# Patient Record
Sex: MALE | Race: WHITE | NOT HISPANIC OR LATINO | Employment: FULL TIME | URBAN - METROPOLITAN AREA
[De-identification: names, ages, dates, MRNs, and addresses within clinical notes are randomized per-mention and may not be internally consistent; named-entity substitution may affect disease eponyms.]

---
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
## 2024-01-24 ENCOUNTER — OFFICE VISIT (OUTPATIENT)
Dept: URGENT CARE | Facility: CLINIC | Age: 19
End: 2024-01-24
Payer: COMMERCIAL

## 2024-01-24 VITALS
HEIGHT: 68 IN | RESPIRATION RATE: 16 BRPM | WEIGHT: 130 LBS | HEART RATE: 93 BPM | DIASTOLIC BLOOD PRESSURE: 67 MMHG | SYSTOLIC BLOOD PRESSURE: 117 MMHG | OXYGEN SATURATION: 100 % | TEMPERATURE: 100 F | BODY MASS INDEX: 19.7 KG/M2

## 2024-01-24 DIAGNOSIS — J11.1 INFLUENZA-LIKE ILLNESS: ICD-10-CM

## 2024-01-24 DIAGNOSIS — R50.9 FEVER, UNSPECIFIED FEVER CAUSE: Primary | ICD-10-CM

## 2024-01-24 LAB
CTP QC/QA: YES
POC MOLECULAR INFLUENZA A AGN: NEGATIVE
POC MOLECULAR INFLUENZA B AGN: NEGATIVE

## 2024-01-24 PROCEDURE — 99203 OFFICE O/P NEW LOW 30 MIN: CPT | Mod: ,,, | Performed by: PHYSICIAN ASSISTANT

## 2024-01-24 PROCEDURE — 87502 INFLUENZA DNA AMP PROBE: CPT | Mod: QW,,, | Performed by: PHYSICIAN ASSISTANT

## 2024-01-24 NOTE — PATIENT INSTRUCTIONS
Fever / Body Aches: Use OTC Tylenol or Motrin per package instructions for fever or body aches.  Sore Throat: Use OTC lozenges or throat sprays, gargle with warm salt and water, warm tea with honey.   Cough:   Cover your mouth with coughing, avoid sharing cups or lip balm, wash your hand before eating or touching your face.    Please quarantine for 5 days from the onset of symptoms. On day 6 of illness, you can return to work/school as long as you have not experienced fever in the last 24 hours.   Seek emergency care for breathing difficulties, chest pain, shortness of breath, or confusion. Given the nature of this disease, severe respiratory distress can develop suddenly on day 8 or 9 of clinical course.  Follow up with you primary care doctor as needed.      Updated CDC guidelines can be found at: https://www.cdc.gov/coronavirus/2019-ncov/hcp/ghgvtx-ab-oaeb.html

## 2024-01-24 NOTE — PROGRESS NOTES
"Subjective:      Patient ID: Regan Ibrahim is a 18 y.o. male.    Vitals:  height is 5' 8" (1.727 m) and weight is 59 kg (130 lb). His temperature is 99.7 °F (37.6 °C). His blood pressure is 117/67 and his pulse is 93. His respiration is 16 and oxygen saturation is 100%.     Chief Complaint: Fever     Patient is a 18 y.o. male who presents to urgent care with complaints of fever 101, runny nose, cough, and headache x 1 day.  Denies sore throat shortness of breath or GI symptoms.      ROS   Objective:     Physical Exam   Constitutional: He is oriented to person, place, and time. He appears well-developed. He is cooperative.  Non-toxic appearance. He does not appear ill. No distress.   HENT:   Head: Normocephalic and atraumatic.   Ears:   Right Ear: Hearing, tympanic membrane, external ear and ear canal normal.   Left Ear: Hearing, tympanic membrane, external ear and ear canal normal.   Nose: Nose normal. No nasal deformity. No epistaxis.   Mouth/Throat: Uvula is midline, oropharynx is clear and moist and mucous membranes are normal. No trismus in the jaw. Normal dentition. No uvula swelling. No oropharyngeal exudate, posterior oropharyngeal edema or posterior oropharyngeal erythema.   Eyes: Conjunctivae and lids are normal. No scleral icterus.   Neck: Trachea normal and phonation normal. Neck supple. No edema present. No erythema present. No neck rigidity present.   Cardiovascular: Normal rate, regular rhythm, normal heart sounds and normal pulses.   Pulmonary/Chest: Effort normal and breath sounds normal. No respiratory distress. He has no decreased breath sounds. He has no rhonchi.   Abdominal: Normal appearance.   Musculoskeletal: Normal range of motion.         General: No deformity. Normal range of motion.   Neurological: He is alert and oriented to person, place, and time. He exhibits normal muscle tone. Coordination normal.   Skin: Skin is warm, dry, intact, not diaphoretic and not pale.   Psychiatric: His " First visit with breastfeeding mom. Discussed feeding baby on cue. Opening mouth, turning head from side to side, bringing hands to mouth and sucking movements are all early hunger cues. Crying is a late hunger cue. Do lots of skin to skin to help recognize early feeding cues. If baby does not nurse, continue skin to skin and offer again later. On average newborns eat at least 8 or more times per day without restriction. Cluster feeding is normal.  Reviewed positioning techniques and signs of a good latch. Discussed holding baby so that ear, shoulder and hip are in a straight line. Baby is held close and nose lines up with mom's nipple. Discussed supporting baby's neck and mom's breast.  When baby opens wide bring baby quickly onto the breast.  Try for an assymetrical latch with nipple pointed towards the roof of baby's mouth. Mouth should be wide and lips flanged around the breast.  Encouraged to nurse on the first breast until baby finishes that side. If baby comes off the breast quickly, you can re-offer that same side to ensure good stimulation before moving baby to next side. Offer the second breast, baby can take the second breast as long as desired. It is ok if they do not take the second side when offered. Listen and look for swallows. Once milk is in over the next few days, swallowing will become more frequent and obvious. If baby pausing on the breast longer than 10 seconds, remind baby to nurse. Attempt to burp between breasts and after feeding. Rotate which breast the baby starts on. Discussed expected output based on age. Discussed feed on demand. If necessary rouse for feedings at least until above birth weight. Reviewed Breastfeeding Packet and encouraged mom to write down feedings and output at least until first Ped visit.   In accordance with the 4391 AAP Policy Statement on Breastfeeding, Mothers of healthy term  infants should be delay pacifier use until breastfeeding "speech is normal and behavior is normal. Judgment and thought content normal.   Nursing note and vitals reviewed.    The patient declines COVID testing.     Previous History      Review of patient's allergies indicates:  No Known Allergies    Past Medical History:   Diagnosis Date    Known health problems: none      No current outpatient medications  Past Surgical History:   Procedure Laterality Date    none       Family History   Family history unknown: Yes       Social History     Tobacco Use    Smoking status: Never    Smokeless tobacco: Never   Substance Use Topics    Alcohol use: Never    Drug use: Never        Physical Exam      Vital Signs Reviewed   /67   Pulse 93   Temp 99.7 °F (37.6 °C)   Resp 16   Ht 5' 8" (1.727 m)   Wt 59 kg (130 lb)   SpO2 100%   BMI 19.77 kg/m²        Procedures    Procedures     Labs     Results for orders placed or performed in visit on 01/24/24   POCT Influenza A/B Molecular   Result Value Ref Range    POC Molecular Influenza A Ag Negative Negative, Not Reported    POC Molecular Influenza B Ag Negative Negative, Not Reported     Acceptable Yes      Assessment:     1. Fever, unspecified fever cause    2. Influenza-like illness        Plan:       Fever, unspecified fever cause  -     POCT Influenza A/B Molecular    Influenza-like illness      Fever / Body Aches: Use OTC Tylenol or Motrin per package instructions for fever or body aches.  Sore Throat: Use OTC lozenges or throat sprays, gargle with warm salt and water, warm tea with honey.   Cough:   Cover your mouth with coughing, avoid sharing cups or lip balm, wash your hand before eating or touching your face.    Please quarantine for 5 days from the onset of symptoms. On day 6 of illness, you can return to work/school as long as you have not experienced fever in the last 24 hours.   Seek emergency care for breathing difficulties, chest pain, shortness of breath, or confusion. Given the nature of this " is well-established, usually about 3 to 4 wk after birth. Pacifiers are not available on the Mother Infant Unit. Artificial nipples should also be avoided until breastfeeding is well established. disease, severe respiratory distress can develop suddenly on day 8 or 9 of clinical course.  Follow up with you primary care doctor as needed.      Updated CDC guidelines can be found at: https://www.cdc.gov/coronavirus/2019-ncov/hcp/hjxwsm-lx-xcoi.html